# Patient Record
Sex: MALE | Race: WHITE | NOT HISPANIC OR LATINO | Employment: UNEMPLOYED | URBAN - METROPOLITAN AREA
[De-identification: names, ages, dates, MRNs, and addresses within clinical notes are randomized per-mention and may not be internally consistent; named-entity substitution may affect disease eponyms.]

---
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
## 2023-06-07 ENCOUNTER — ALLIED HEALTH/NURSE VISIT (OUTPATIENT)
Dept: FAMILY MEDICINE | Facility: CLINIC | Age: 18
End: 2023-06-07
Payer: COMMERCIAL

## 2023-06-07 ENCOUNTER — VIRTUAL VISIT (OUTPATIENT)
Dept: FAMILY MEDICINE | Facility: CLINIC | Age: 18
End: 2023-06-07
Payer: COMMERCIAL

## 2023-06-07 DIAGNOSIS — J02.9 SORE THROAT: Primary | ICD-10-CM

## 2023-06-07 LAB
DEPRECATED S PYO AG THROAT QL EIA: NEGATIVE
GROUP A STREP BY PCR: NOT DETECTED

## 2023-06-07 PROCEDURE — 99207 PR NO CHARGE NURSE ONLY: CPT

## 2023-06-07 PROCEDURE — 99213 OFFICE O/P EST LOW 20 MIN: CPT | Mod: VID | Performed by: NURSE PRACTITIONER

## 2023-06-07 PROCEDURE — 87651 STREP A DNA AMP PROBE: CPT | Mod: VID | Performed by: NURSE PRACTITIONER

## 2023-06-07 NOTE — LETTER
June 9, 2023      Ciro Bryson  [address] [id number]        Dear ,    We are writing to inform you of your test results.    Throat culture negative.   Follow up if symptoms not improved in the next 1-2 weeks.     Resulted Orders   Streptococcus A Rapid Screen w/Reflex to PCR - Clinic Collect   Result Value Ref Range    Group A Strep antigen Negative Negative   Group A Streptococcus PCR Throat Swab   Result Value Ref Range    Group A strep by PCR Not Detected Not Detected    Narrative    The Xpert Xpress Strep A test, performed on the [employer], is a rapid, qualitative in vitro diagnostic test for the detection of Streptococcus pyogenes (Group A ß-hemolytic Streptococcus, Strep A) in throat swab specimens from patients with signs and symptoms of pharyngitis. The Xpert Xpress Strep A test can be used as an aid in the diagnosis of Group A Streptococcal pharyngitis. The assay is not intended to monitor treatment for Group A Streptococcus infections. The Xpert Xpress Strep A test utilizes an automated real-time polymerase chain reaction (PCR) to detect Streptococcus pyogenes DNA.       If you have any questions or concerns, please call the clinic at the number listed above.       Sincerely,      Ingris Cha NP

## 2024-02-05 ENCOUNTER — TELEPHONE (OUTPATIENT)
Dept: FAMILY MEDICINE | Facility: CLINIC | Age: 19
End: 2024-02-05

## 2024-02-05 NOTE — LETTER
February 5, 2024      Oswaldo Bryson  [address] [id number]        Dear Oswaldo,     Your healthcare team cares about your health. To provide you with the best care, we have reviewed your chart and based on our findings, we see that you are due for:   Annual preventive exam.  2.  An Asthma Control Test (ACT) that our clinic uses to monitor and manage your asthma. This test is an assessment tool that we use to determine how well your asthma is controlled.  Please complete the enclosed form and return in the provided envelope.  Thank you for choosing [facility] for your healthcare needs. For any questions, concerns, or to schedule an appointment please contact the clinic.   Healthy Regards,    Your [facility]

## 2024-02-05 NOTE — TELEPHONE ENCOUNTER
Patient Quality Outreach    Patient is due for the following:   Asthma  -  ACT needed  Physical Preventive Adult Physical    Next Steps:   Schedule a Adult Preventative  Patient was assigned appropriate questionnaire to complete    Type of outreach:    Sent letter.      Questions for provider review:    None           Kiera Mcrae, [facility]